# Patient Record
Sex: MALE | Race: WHITE | Employment: FULL TIME | URBAN - METROPOLITAN AREA
[De-identification: names, ages, dates, MRNs, and addresses within clinical notes are randomized per-mention and may not be internally consistent; named-entity substitution may affect disease eponyms.]

---

## 2018-06-25 ENCOUNTER — OFFICE VISIT (OUTPATIENT)
Dept: FAMILY MEDICINE CLINIC | Age: 28
End: 2018-06-25
Payer: COMMERCIAL

## 2018-06-25 VITALS
HEIGHT: 71 IN | OXYGEN SATURATION: 98 % | HEART RATE: 100 BPM | DIASTOLIC BLOOD PRESSURE: 82 MMHG | SYSTOLIC BLOOD PRESSURE: 128 MMHG | TEMPERATURE: 98.8 F | WEIGHT: 210.25 LBS | RESPIRATION RATE: 19 BRPM | BODY MASS INDEX: 29.44 KG/M2

## 2018-06-25 DIAGNOSIS — Z76.89 ENCOUNTER TO ESTABLISH CARE: Primary | ICD-10-CM

## 2018-06-25 DIAGNOSIS — K21.9 GASTROESOPHAGEAL REFLUX DISEASE WITHOUT ESOPHAGITIS: ICD-10-CM

## 2018-06-25 DIAGNOSIS — Z13.31 DEPRESSION SCREENING: ICD-10-CM

## 2018-06-25 PROCEDURE — 36415 COLL VENOUS BLD VENIPUNCTURE: CPT | Performed by: FAMILY MEDICINE

## 2018-06-25 PROCEDURE — 99203 OFFICE O/P NEW LOW 30 MIN: CPT | Performed by: FAMILY MEDICINE

## 2018-06-25 ASSESSMENT — PATIENT HEALTH QUESTIONNAIRE - PHQ9
2. FEELING DOWN, DEPRESSED OR HOPELESS: 0
SUM OF ALL RESPONSES TO PHQ9 QUESTIONS 1 & 2: 0
SUM OF ALL RESPONSES TO PHQ QUESTIONS 1-9: 0
1. LITTLE INTEREST OR PLEASURE IN DOING THINGS: 0

## 2018-07-25 PROBLEM — Z13.31 DEPRESSION SCREENING: Status: RESOLVED | Noted: 2018-06-25 | Resolved: 2018-07-25

## 2020-12-22 ENCOUNTER — OFFICE VISIT (OUTPATIENT)
Dept: PRIMARY CARE CLINIC | Age: 30
End: 2020-12-22
Payer: COMMERCIAL

## 2020-12-22 VITALS
DIASTOLIC BLOOD PRESSURE: 84 MMHG | WEIGHT: 215 LBS | SYSTOLIC BLOOD PRESSURE: 128 MMHG | OXYGEN SATURATION: 98 % | BODY MASS INDEX: 30.1 KG/M2 | HEIGHT: 71 IN | HEART RATE: 69 BPM | TEMPERATURE: 99.1 F

## 2020-12-22 DIAGNOSIS — J06.9 VIRAL URI: ICD-10-CM

## 2020-12-22 DIAGNOSIS — Z20.822 CLOSE EXPOSURE TO COVID-19 VIRUS: ICD-10-CM

## 2020-12-22 PROCEDURE — 99213 OFFICE O/P EST LOW 20 MIN: CPT | Performed by: PHYSICIAN ASSISTANT

## 2020-12-22 NOTE — PROGRESS NOTES
Chief Complaint   Headache (started Monday, probable exposure) and Nasal Congestion      History of Present Illness   Source of history provided by: patient. Valeriano Dye is a 35 y.o. old male who has a past medical history of: History reviewed. No pertinent past medical history. Presents to the flu clinic for evaluation of a headache and nasal congestion since yesterday. Patient is mostly concerned because his brother-in-law is being treated for suspected COVID-19. Test is still pending. Denies any fever, chills, loss of taste or smell, CP, dyspnea, LE edema, abdominal pain, vomiting, rash, or lethargy. Denies any hx of asthma or tobacco use. ROS   Pertinent positives and negatives are stated within HPI, all other systems reviewed and are negative. Surgical History:  has a past surgical history that includes Anterior cruciate ligament repair (Left, 2002). Social History:  reports that he has never smoked. He has never used smokeless tobacco. He reports current alcohol use of about 6.0 standard drinks of alcohol per week. He reports that he does not use drugs. Family History: family history includes Breast Cancer (age of onset: 47) in his mother. Allergies: Seasonal    Physical Exam      VS:  /84   Pulse 69   Temp 99.1 °F (37.3 °C)   Ht 5' 11\" (1.803 m)   Wt 215 lb (97.5 kg)   SpO2 98%   BMI 29.99 kg/m²    Oxygen Saturation Interpretation: Normal.    Constitutional:  Alert, development consistent with age. NAD. Head:  NC/NT. Airway patent. Mouth: Posterior pharynx with mild erythema and clear postnasal drip. No tonsillar hypertrophy or exudate. Neck:  Normal ROM. Supple. No anterior cervical adenopathy noted. Lungs: CTAB without wheezes, rales, or rhonchi. CV:  Regular rate and rhythm, normal heart sounds, without pathological murmurs, ectopy, gallops, or rubs. Skin:  Normal turgor. Warm, dry, without visible rash. Lymphatic: No lymphangitis or adenopathy noted. Neurological:  Oriented. Motor functions intact. Lab / Imaging Results   (All laboratory and radiology results have been personally reviewed by myself)  Labs:  No results found for this visit on 12/22/20. Imaging: All Radiology results interpreted by Radiologist unless otherwise noted. No results found. Medical Decision Making   Pt non-toxic, in no apparent distress and stable at time of discharge. Assessment/Plan   Anu Iverson was seen today for headache and nasal congestion. Diagnoses and all orders for this visit:    Viral URI  -     Cancel: COVID-19 Ambulatory; Future  -     COVID-19; Future    Close exposure to COVID-19 virus  -     COVID-19; Future      COVID-19 swab obtained and pending, will call with results once available. Advised cautionary self-quarantine at home in the interim. Pt should remain out of work into the general public until results are confirmed negative. Pt should also be fever free for 24 hours and symptoms should be improved overall prior to returning. Increase fluids and rest. Symptomatic relief discussed including Tylenol prn pain/fever. Schedule virtual f/u with PCP in 7-10 days if symptoms persist. ED sooner if symptoms worsen or change. ED immediately with high or refractory fever, progressive SOB, dyspnea, CP, calf pain/swelling, shaking chills, vomiting, abdominal pain, lethargy, flank pain, or decreased urinary output. Pt verbalizes understanding and is in agreement with plan of care. All questions answered. Félix Roberts PA-C    This visit was provided as a focused evaluation during the COVID -19 pandemic/national emergency. A comprehensive review of all previous patient history and testing was not conducted. Pertinent findings were elicited during the visit.

## 2020-12-23 LAB — SARS-COV-2, PCR: NOT DETECTED

## 2021-01-04 ENCOUNTER — OFFICE VISIT (OUTPATIENT)
Dept: PRIMARY CARE CLINIC | Age: 31
End: 2021-01-04
Payer: COMMERCIAL

## 2021-01-04 VITALS
HEART RATE: 111 BPM | BODY MASS INDEX: 30.1 KG/M2 | OXYGEN SATURATION: 98 % | HEIGHT: 71 IN | TEMPERATURE: 100.8 F | DIASTOLIC BLOOD PRESSURE: 86 MMHG | SYSTOLIC BLOOD PRESSURE: 132 MMHG | WEIGHT: 215 LBS

## 2021-01-04 DIAGNOSIS — U07.1 COVID-19: Primary | ICD-10-CM

## 2021-01-04 DIAGNOSIS — R50.81 FEVER IN OTHER DISEASES: ICD-10-CM

## 2021-01-04 DIAGNOSIS — R68.89 FLU-LIKE SYMPTOMS: ICD-10-CM

## 2021-01-04 LAB
INFLUENZA A ANTIBODY: NEGATIVE
INFLUENZA B ANTIBODY: NEGATIVE
Lab: NORMAL
QC PASS/FAIL: NORMAL
SARS-COV-2, POC: DETECTED

## 2021-01-04 PROCEDURE — 87426 SARSCOV CORONAVIRUS AG IA: CPT | Performed by: NURSE PRACTITIONER

## 2021-01-04 PROCEDURE — 87804 INFLUENZA ASSAY W/OPTIC: CPT | Performed by: NURSE PRACTITIONER

## 2021-01-04 PROCEDURE — 99213 OFFICE O/P EST LOW 20 MIN: CPT | Performed by: NURSE PRACTITIONER

## 2021-01-04 NOTE — PROGRESS NOTES
Chief Complaint   Fever (Started last night. ), Chills, Generalized Body Aches, and Headache      History of Present Illness   Source of history provided by:  patient. Mundo Salcido is a 35 y.o. old male who presents to the flu clinic with complaints of Fever (Tmax 100.9F), Chills, Generalized Body Aches, Headache, dry Cough, Fatigue and Patient was exposed to someone who is a known Covid-19 infected person or directly caring for such person x 2 days. States symptoms have worsened since onset. Has been taking Tylenol for the symptoms with some relief. Denies any Shortness of breath, Nausea, Vomiting, Chest Pain, Abdominal Pain or Rash. Denies any hx of asthma, COPD or emphysema. Denies tobacco use. Exposed to friend 5 days ago that is COVID +.    ROS   Pertinent positives and negatives are stated within HPI, all other systems reviewed and are negative. Past Medical History:  has no past medical history on file. Past Surgical History:  has a past surgical history that includes Anterior cruciate ligament repair (Left, 2002). Social History:  reports that he has never smoked. He has never used smokeless tobacco. He reports current alcohol use of about 6.0 standard drinks of alcohol per week. He reports that he does not use drugs. Family History: family history includes Breast Cancer (age of onset: 47) in his mother. Allergies: Seasonal    Physical Exam   Vital Signs:  /86   Pulse 111   Temp 100.8 °F (38.2 °C)   Ht 5' 11\" (1.803 m)   Wt 215 lb (97.5 kg)   SpO2 98%   BMI 29.99 kg/m²    Oxygen Saturation Interpretation: Normal.    Constitutional:  Alert, development consistent with age. NAD. Head:  NC/NT. Airway patent. Ears: TMs clear bilaterally. Canals without exudate or swelling bilaterally. Mouth: Posterior pharynx with mild erythema and clear postnasal drip. No tonsillar hypertrophy or exudate. Neck:  Normal ROM. Supple. No anterior cervical adenopathy noted. Lungs: CTAB without wheezes, rales, or rhonchi. CV:  Tachycardia with regular rhythm, normal heart sounds, without pathological murmurs, ectopy, gallops, or rubs. Skin:  Normal turgor. Warm, dry, without visible rash. Lymphatic: No lymphangitis or adenopathy noted. Neurological:  Oriented. Motor functions intact. Lab / Imaging Results   (All laboratory and radiology results have been personally reviewed by myself)  Labs:  No results found for this visit on 01/04/21. Imaging: All Radiology results interpreted by Radiologist unless otherwise noted. No results found. Medical Decision Making   Pt non-toxic, in no apparent distress and stable at time of discharge. Assessment/Plan   Greta Ann was seen today for fever, chills, generalized body aches and headache. Diagnoses and all orders for this visit:    COVID-19  -     POCT COVID-19, Antigen  - OTC meds for symptom relief    Flu-like symptoms  -     POCT Influenza A/B    Fever in other diseases        - Tylenol or Ibuprofen as needed    Rapid COVID positive in office, patient advised of results. Patient requires 10 day isolation from symptom onset. Increase fluids and rest. Symptomatic relief discussed including Tylenol prn pain/fever. Schedule f/u with PCP in 7-10 days if symptoms persist. ED sooner if symptoms worsen or change. ED immediately with high or refractory fever, progressive SOB, dyspnea, CP, calf pain/swelling, shaking chills, vomiting, abdominal pain, lethargy, flank pain, or decreased urinary output. Pt verbalizes understanding and is in agreement with plan of care. All questions answered. Car Turner, APRN - CNP    This visit was provided as a focused evaluation during the COVID -19 pandemic/national emergency. A comprehensive review of all previous patient history and testing was not conducted. Pertinent findings were elicited during the visit. *NOTE: This report was transcribed using voice recognition software. Every effort was made to ensure accuracy; however, inadvertent computerized transcription errors may be present.